# Patient Record
Sex: MALE | Race: WHITE | Employment: OTHER | URBAN - METROPOLITAN AREA
[De-identification: names, ages, dates, MRNs, and addresses within clinical notes are randomized per-mention and may not be internally consistent; named-entity substitution may affect disease eponyms.]

---

## 2021-08-09 NOTE — PERIOP NOTES
PER PAT :  CALLED AND MADE PAT APPT FOR 8/10-900AM,   COVID QUESTIONS COMPLETED.    COVID VAX W/PFIZER, LAST DOSE WAS 2/27/21

## 2021-08-10 ENCOUNTER — HOSPITAL ENCOUNTER (OUTPATIENT)
Dept: PREADMISSION TESTING | Age: 66
Discharge: HOME | End: 2021-08-10
Payer: MEDICARE

## 2021-08-10 VITALS
WEIGHT: 192.9 LBS | TEMPERATURE: 98.1 F | BODY MASS INDEX: 27.62 KG/M2 | HEART RATE: 62 BPM | HEIGHT: 70 IN | DIASTOLIC BLOOD PRESSURE: 81 MMHG | SYSTOLIC BLOOD PRESSURE: 136 MMHG | OXYGEN SATURATION: 96 %

## 2021-08-10 LAB
BASOPHILS # BLD: 0.1 K/UL (ref 0–0.1)
BASOPHILS NFR BLD: 1 % (ref 0–1)
DIFFERENTIAL METHOD BLD: NORMAL
EOSINOPHIL # BLD: 0.4 K/UL (ref 0–0.4)
EOSINOPHIL NFR BLD: 6 % (ref 0–7)
ERYTHROCYTE [DISTWIDTH] IN BLOOD BY AUTOMATED COUNT: 13.3 % (ref 11.5–14.5)
HCT VFR BLD AUTO: 43.7 % (ref 36.6–50.3)
HGB BLD-MCNC: 14.6 G/DL (ref 12.1–17)
IMM GRANULOCYTES # BLD AUTO: 0 K/UL (ref 0–0.04)
IMM GRANULOCYTES NFR BLD AUTO: 0 % (ref 0–0.5)
LYMPHOCYTES # BLD: 1.8 K/UL (ref 0.8–3.5)
LYMPHOCYTES NFR BLD: 26 % (ref 12–49)
MCH RBC QN AUTO: 31.4 PG (ref 26–34)
MCHC RBC AUTO-ENTMCNC: 33.4 G/DL (ref 30–36.5)
MCV RBC AUTO: 94 FL (ref 80–99)
MONOCYTES # BLD: 0.7 K/UL (ref 0–1)
MONOCYTES NFR BLD: 10 % (ref 5–13)
NEUTS SEG # BLD: 3.8 K/UL (ref 1.8–8)
NEUTS SEG NFR BLD: 57 % (ref 32–75)
NRBC # BLD: 0 K/UL (ref 0–0.01)
NRBC BLD-RTO: 0 PER 100 WBC
PLATELET # BLD AUTO: 179 K/UL (ref 150–400)
PMV BLD AUTO: 10.1 FL (ref 8.9–12.9)
RBC # BLD AUTO: 4.65 M/UL (ref 4.1–5.7)
WBC # BLD AUTO: 6.7 K/UL (ref 4.1–11.1)

## 2021-08-10 PROCEDURE — 85025 COMPLETE CBC W/AUTO DIFF WBC: CPT

## 2021-08-10 PROCEDURE — 36415 COLL VENOUS BLD VENIPUNCTURE: CPT

## 2021-08-10 PROCEDURE — 93005 ELECTROCARDIOGRAM TRACING: CPT

## 2021-08-10 RX ORDER — ATORVASTATIN CALCIUM 20 MG/1
20 TABLET, FILM COATED ORAL
COMMUNITY

## 2021-08-10 RX ORDER — ASPIRIN 81 MG/1
81 TABLET ORAL DAILY
COMMUNITY

## 2021-08-10 RX ORDER — CHOLECALCIFEROL TAB 125 MCG (5000 UNIT) 125 MCG
TAB ORAL DAILY
COMMUNITY

## 2021-08-10 RX ORDER — LISINOPRIL 5 MG/1
5 TABLET ORAL DAILY
COMMUNITY

## 2021-08-10 RX ORDER — METOPROLOL SUCCINATE 50 MG/1
50 TABLET, EXTENDED RELEASE ORAL DAILY
COMMUNITY

## 2021-08-10 NOTE — PERIOP NOTES
Preoperative instructions reviewed with patient. Patient given SSI infection FAQS sheet. Given two bottles of skin prep chlorhexidine soap; given written and verbal instructions on use. Patient was given the opportunity to ask questions on the information provided.

## 2021-08-11 LAB
ATRIAL RATE: 56 BPM
BACTERIA SPEC CULT: NORMAL
BACTERIA SPEC CULT: NORMAL
CALCULATED P AXIS, ECG09: 53 DEGREES
CALCULATED R AXIS, ECG10: 29 DEGREES
CALCULATED T AXIS, ECG11: 40 DEGREES
DIAGNOSIS, 93000: NORMAL
P-R INTERVAL, ECG05: 230 MS
Q-T INTERVAL, ECG07: 454 MS
QRS DURATION, ECG06: 88 MS
QTC CALCULATION (BEZET), ECG08: 438 MS
SERVICE CMNT-IMP: NORMAL
VENTRICULAR RATE, ECG03: 56 BPM

## 2021-08-16 NOTE — PERIOP NOTES
PAT Nurse Practitioner   Pre-Operative Chart Review/Assessment:-ORTHOPEDIC/NEUROSURGICAL SPINE                Patient Name:  Claire Ko                                                           Age:   77 y.o.    :  1955     Today's Date:  2021     Date of PAT:   8/10/2021      Date of Surgery:    2021      Procedure(s):  C3-C6 ACDF     Surgeon:   Dr. Brielle Maguire                       PLAN:       1)  PCP: Dr. Jacky Salgado        2)  Cardiac Clearance: Pt followed by Dr. Alon Ronquillo. LOV 20. Condition stable. No new symptoms or changes to current regimen. OV note on chart. 3)  Diabetic Treatment Consult: A1c not ordered. No hx of DM. 4)  Sleep Apnea evaluation:  + [facility] dx. Pt uses oral appliance. 5)  Treatment for MRSA/Staph Aureus: Neg       6)  Additional Concerns: Former smoker, HTN, CAD s/p stent              Vital Signs:         Vitals:    08/10/21 0922   BP: 136/81   Pulse: 62   Temp: 98.1 °F (36.7 °C)   SpO2: 96%   Weight: 87.5 kg (192 lb 14.4 oz)   Height: 5' 10\" (1.778 m)            ____________________________________________  PAST MEDICAL HISTORY  Past Medical History:   Diagnosis Date    Adrenal mass ([address]     STABLE AND BENIGN    Arthritis     CAD (coronary artery disease) 2011    MI X 1 STENT    Chronic pain     Hypertension     Sleep apnea     ORAL APPLIANCE      ____________________________________________  PAST SURGICAL HISTORY  Past Surgical History:   Procedure Laterality Date    HX BACK SURGERY  ,     LAMINECTOMY AND FUSION    HX ORTHOPAEDIC Left     REPAIR BICEP TENDON    HX VASECTOMY        ____________________________________________  HOME MEDICATIONS    Current Outpatient Medications   Medication Sig    lisinopriL (PRINIVIL, ZESTRIL) 5 mg tablet Take 5 mg by mouth daily.  metoprolol succinate (TOPROL-XL) 50 mg XL tablet Take 50 mg by mouth daily.  aspirin delayed-release 81 mg tablet Take 81 mg by mouth daily.     atorvastatin (LIPITOR) 20 mg tablet Take 20 mg by mouth nightly.  cholecalciferol (Vitamin D3) (5000 Units/125 mcg) tab tablet Take  by mouth daily. No current facility-administered medications for this encounter.      ____________________________________________  ALLERGIES  No Known Allergies   ____________________________________________  SOCIAL HISTORY  Social History     Tobacco Use    Smoking status: Former Smoker     Packs/day: 1.00     Years: 30.00     Pack years: 30.00     Quit date: 8/10/2011     Years since quitting: 10.0    Smokeless tobacco: Never Used   Substance Use Topics    Alcohol use: Yes     Alcohol/week: 8.0 standard drinks     Types: 8 Shots of liquor per week      ____________________________________________        Labs:     Hospital Outpatient Visit on 08/10/2021   Component Date Value Ref Range Status    WBC 08/10/2021 6.7  4.1 - 11.1 K/uL Final    RBC 08/10/2021 4.65  4.10 - 5.70 M/uL Final    HGB 08/10/2021 14.6  12.1 - 17.0 g/dL Final    HCT 08/10/2021 43.7  36.6 - 50.3 % Final    MCV 08/10/2021 94.0  80.0 - 99.0 FL Final    MCH 08/10/2021 31.4  26.0 - 34.0 PG Final    MCHC 08/10/2021 33.4  30.0 - 36.5 g/dL Final    RDW 08/10/2021 13.3  11.5 - 14.5 % Final    PLATELET 43/46/5936 467  150 - 400 K/uL Final    MPV 08/10/2021 10.1  8.9 - 12.9 FL Final    NRBC 08/10/2021 0.0  0  WBC Final    ABSOLUTE NRBC 08/10/2021 0.00  0.00 - 0.01 K/uL Final    NEUTROPHILS 08/10/2021 57  32 - 75 % Final    LYMPHOCYTES 08/10/2021 26  12 - 49 % Final    MONOCYTES 08/10/2021 10  5 - 13 % Final    EOSINOPHILS 08/10/2021 6  0 - 7 % Final    BASOPHILS 08/10/2021 1  0 - 1 % Final    IMMATURE GRANULOCYTES 08/10/2021 0  0.0 - 0.5 % Final    ABS. NEUTROPHILS 08/10/2021 3.8  1.8 - 8.0 K/UL Final    ABS. LYMPHOCYTES 08/10/2021 1.8  0.8 - 3.5 K/UL Final    ABS. MONOCYTES 08/10/2021 0.7  0.0 - 1.0 K/UL Final    ABS. EOSINOPHILS 08/10/2021 0.4  0.0 - 0.4 K/UL Final    ABS.  BASOPHILS 08/10/2021 0.1  0.0 - 0.1 K/UL Final    ABS. IMM. GRANS. 08/10/2021 0.0  0.00 - 0.04 K/UL Final    DF 08/10/2021 AUTOMATED    Final    Special Requests: 08/10/2021 NO SPECIAL REQUESTS    Final    Culture result: 08/10/2021 MRSA NOT PRESENT    Final            Ventricular Rate 08/10/2021 56  BPM Final    Atrial Rate 08/10/2021 56  BPM Final    P-R Interval 08/10/2021 230  ms Final    QRS Duration 08/10/2021 88  ms Final    Q-T Interval 08/10/2021 454  ms Final    QTC Calculation (Bezet) 08/10/2021 438  ms Final    Calculated P Axis 08/10/2021 53  degrees Final    Calculated R Axis 08/10/2021 29  degrees Final    Calculated T Axis 08/10/2021 40  degrees Final    Diagnosis 08/10/2021    Final                    Value:Sinus bradycardia with 1st degree AV block  Inferior infarct , age undetermined  Abnormal ECG  No previous ECGs available  Confirmed by Otilio Morales ([id number]) on 8/11/2021 10:37:02 AM          Skin:     Denies open wounds, cuts, sores, rashes or other areas of concern in PAT assessment.         Nadia Salinas NP

## 2021-08-18 ENCOUNTER — ANESTHESIA EVENT (OUTPATIENT)
Dept: SURGERY | Age: 66
End: 2021-08-18
Payer: MEDICARE

## 2021-08-19 ENCOUNTER — HOSPITAL ENCOUNTER (OUTPATIENT)
Age: 66
Setting detail: OBSERVATION
Discharge: HOME | End: 2021-08-20
Attending: NEUROLOGICAL SURGERY | Admitting: NEUROLOGICAL SURGERY
Payer: MEDICARE

## 2021-08-19 ENCOUNTER — ANESTHESIA (OUTPATIENT)
Dept: SURGERY | Age: 66
End: 2021-08-19
Payer: MEDICARE

## 2021-08-19 ENCOUNTER — APPOINTMENT (OUTPATIENT)
Dept: GENERAL RADIOLOGY | Age: 66
End: 2021-08-19
Attending: NEUROLOGICAL SURGERY
Payer: MEDICARE

## 2021-08-19 DIAGNOSIS — Z98.1 S/P CERVICAL SPINAL FUSION: Primary | ICD-10-CM

## 2021-08-19 PROBLEM — M48.02 CERVICAL STENOSIS OF SPINAL CANAL: Status: ACTIVE | Noted: 2021-08-19

## 2021-08-19 LAB
GLUCOSE BLD STRIP.AUTO-MCNC: 109 MG/DL (ref 65–117)
SERVICE CMNT-IMP: NORMAL

## 2021-08-19 PROCEDURE — 74011250637 HC RX REV CODE- 250/637: Performed by: ANESTHESIOLOGY

## 2021-08-19 PROCEDURE — 77030038552 HC DRN WND MDII -A: Performed by: NEUROLOGICAL SURGERY

## 2021-08-19 PROCEDURE — 77030003029 HC SUT VCRL J&J -B: Performed by: NEUROLOGICAL SURGERY

## 2021-08-19 PROCEDURE — 74011250637 HC RX REV CODE- 250/637: Performed by: PHYSICIAN ASSISTANT

## 2021-08-19 PROCEDURE — 77030011267 HC ELECTRD BLD COVD -A: Performed by: NEUROLOGICAL SURGERY

## 2021-08-19 PROCEDURE — 74011250636 HC RX REV CODE- 250/636: Performed by: ANESTHESIOLOGY

## 2021-08-19 PROCEDURE — 77030012890

## 2021-08-19 PROCEDURE — 77030014006 HC SPNG HEMSTAT J&J -A: Performed by: NEUROLOGICAL SURGERY

## 2021-08-19 PROCEDURE — C1713 ANCHOR/SCREW BN/BN,TIS/BN: HCPCS | Performed by: NEUROLOGICAL SURGERY

## 2021-08-19 PROCEDURE — 99218 HC RM OBSERVATION: CPT

## 2021-08-19 PROCEDURE — 74011250637 HC RX REV CODE- 250/637: Performed by: NEUROLOGICAL SURGERY

## 2021-08-19 PROCEDURE — 77030002933 HC SUT MCRYL J&J -A: Performed by: NEUROLOGICAL SURGERY

## 2021-08-19 PROCEDURE — 77030014650 HC SEAL MTRX FLOSEL BAXT -C: Performed by: NEUROLOGICAL SURGERY

## 2021-08-19 PROCEDURE — 76010000174 HC OR TIME 3.5 TO 4 HR INTENSV-TIER 1: Performed by: NEUROLOGICAL SURGERY

## 2021-08-19 PROCEDURE — 77030040608 HC GRFT BN MATR VIVIGEN FORMBL LIFV -I: Performed by: NEUROLOGICAL SURGERY

## 2021-08-19 PROCEDURE — 82962 GLUCOSE BLOOD TEST: CPT

## 2021-08-19 PROCEDURE — 74011000250 HC RX REV CODE- 250: Performed by: NEUROLOGICAL SURGERY

## 2021-08-19 PROCEDURE — 2709999900 HC NON-CHARGEABLE SUPPLY: Performed by: NEUROLOGICAL SURGERY

## 2021-08-19 PROCEDURE — 77030008684 HC TU ET CUF COVD -B: Performed by: ANESTHESIOLOGY

## 2021-08-19 PROCEDURE — 77030030028 HC BIT DRL SPN FLAT CHK DSP J&J -B: Performed by: NEUROLOGICAL SURGERY

## 2021-08-19 PROCEDURE — 76060000038 HC ANESTHESIA 3.5 TO 4 HR: Performed by: NEUROLOGICAL SURGERY

## 2021-08-19 PROCEDURE — 74011250636 HC RX REV CODE- 250/636: Performed by: NURSE ANESTHETIST, CERTIFIED REGISTERED

## 2021-08-19 PROCEDURE — 77030004391 HC BUR FLUT MEDT -C: Performed by: NEUROLOGICAL SURGERY

## 2021-08-19 PROCEDURE — 77030003666 HC NDL SPINAL BD -A: Performed by: NEUROLOGICAL SURGERY

## 2021-08-19 PROCEDURE — 77030010507 HC ADH SKN DERMBND J&J -B: Performed by: NEUROLOGICAL SURGERY

## 2021-08-19 PROCEDURE — 74011250636 HC RX REV CODE- 250/636: Performed by: NEUROLOGICAL SURGERY

## 2021-08-19 PROCEDURE — 74011000250 HC RX REV CODE- 250: Performed by: NURSE ANESTHETIST, CERTIFIED REGISTERED

## 2021-08-19 PROCEDURE — 77030026438 HC STYL ET INTUB CARD -A: Performed by: ANESTHESIOLOGY

## 2021-08-19 PROCEDURE — 74011000250 HC RX REV CODE- 250: Performed by: ANESTHESIOLOGY

## 2021-08-19 PROCEDURE — 77030029099 HC BN WAX SSPC -A: Performed by: NEUROLOGICAL SURGERY

## 2021-08-19 PROCEDURE — 76210000016 HC OR PH I REC 1 TO 1.5 HR: Performed by: NEUROLOGICAL SURGERY

## 2021-08-19 PROCEDURE — 77030038600 HC TU BPLR IRR DISP STRY -B: Performed by: NEUROLOGICAL SURGERY

## 2021-08-19 PROCEDURE — 77030013079 HC BLNKT BAIR HGGR 3M -A: Performed by: ANESTHESIOLOGY

## 2021-08-19 PROCEDURE — 77030040922 HC BLNKT HYPOTHRM STRY -A

## 2021-08-19 PROCEDURE — C1821 INTERSPINOUS IMPLANT: HCPCS | Performed by: NEUROLOGICAL SURGERY

## 2021-08-19 DEVICE — GRAFT BNE SUB M 5ML CANC DBM FRMBL CELLULAR VIVIGEN: Type: IMPLANTABLE DEVICE | Site: SPINE CERVICAL | Status: FUNCTIONAL

## 2021-08-19 DEVICE — SCREW SPNL L16MM DIA4MM ANT CERV TI SELF DRL VAR ANG FULL: Type: IMPLANTABLE DEVICE | Site: SPINE CERVICAL | Status: FUNCTIONAL

## 2021-08-19 DEVICE — IMPLANTABLE DEVICE: Type: IMPLANTABLE DEVICE | Site: SPINE CERVICAL | Status: FUNCTIONAL

## 2021-08-19 DEVICE — IMPLANTABLE DEVICE
Type: IMPLANTABLE DEVICE | Site: SPINE CERVICAL | Status: FUNCTIONAL
Brand: ACIS® PROTI 360°™

## 2021-08-19 DEVICE — SCREW SPNL L16MM DIA4MM ANT CERV TI SELF DRL FULL THRD: Type: IMPLANTABLE DEVICE | Site: SPINE CERVICAL | Status: FUNCTIONAL

## 2021-08-19 RX ORDER — MIDAZOLAM HYDROCHLORIDE 1 MG/ML
INJECTION, SOLUTION INTRAMUSCULAR; INTRAVENOUS AS NEEDED
Status: DISCONTINUED | OUTPATIENT
Start: 2021-08-19 | End: 2021-08-19 | Stop reason: HOSPADM

## 2021-08-19 RX ORDER — DEXTROSE, SODIUM CHLORIDE, SODIUM LACTATE, POTASSIUM CHLORIDE, AND CALCIUM CHLORIDE 5; .6; .31; .03; .02 G/100ML; G/100ML; G/100ML; G/100ML; G/100ML
125 INJECTION, SOLUTION INTRAVENOUS CONTINUOUS
Status: DISCONTINUED | OUTPATIENT
Start: 2021-08-19 | End: 2021-08-19 | Stop reason: HOSPADM

## 2021-08-19 RX ORDER — FENTANYL CITRATE 50 UG/ML
25 INJECTION, SOLUTION INTRAMUSCULAR; INTRAVENOUS
Status: COMPLETED | OUTPATIENT
Start: 2021-08-19 | End: 2021-08-19

## 2021-08-19 RX ORDER — METOPROLOL SUCCINATE 50 MG/1
50 TABLET, EXTENDED RELEASE ORAL DAILY
Status: DISCONTINUED | OUTPATIENT
Start: 2021-08-20 | End: 2021-08-20 | Stop reason: HOSPADM

## 2021-08-19 RX ORDER — SODIUM CHLORIDE 0.9 % (FLUSH) 0.9 %
5-40 SYRINGE (ML) INJECTION EVERY 8 HOURS
Status: DISCONTINUED | OUTPATIENT
Start: 2021-08-19 | End: 2021-08-19 | Stop reason: HOSPADM

## 2021-08-19 RX ORDER — FENTANYL CITRATE 50 UG/ML
50 INJECTION, SOLUTION INTRAMUSCULAR; INTRAVENOUS AS NEEDED
Status: DISCONTINUED | OUTPATIENT
Start: 2021-08-19 | End: 2021-08-19 | Stop reason: HOSPADM

## 2021-08-19 RX ORDER — LIDOCAINE HYDROCHLORIDE 20 MG/ML
INJECTION, SOLUTION EPIDURAL; INFILTRATION; INTRACAUDAL; PERINEURAL AS NEEDED
Status: DISCONTINUED | OUTPATIENT
Start: 2021-08-19 | End: 2021-08-19 | Stop reason: HOSPADM

## 2021-08-19 RX ORDER — ONDANSETRON 2 MG/ML
4 INJECTION INTRAMUSCULAR; INTRAVENOUS
Status: DISCONTINUED | OUTPATIENT
Start: 2021-08-19 | End: 2021-08-20 | Stop reason: HOSPADM

## 2021-08-19 RX ORDER — MELATONIN
1000 DAILY
Status: DISCONTINUED | OUTPATIENT
Start: 2021-08-20 | End: 2021-08-20 | Stop reason: HOSPADM

## 2021-08-19 RX ORDER — KETAMINE HYDROCHLORIDE 10 MG/ML
INJECTION, SOLUTION INTRAMUSCULAR; INTRAVENOUS AS NEEDED
Status: DISCONTINUED | OUTPATIENT
Start: 2021-08-19 | End: 2021-08-19 | Stop reason: HOSPADM

## 2021-08-19 RX ORDER — ACETAMINOPHEN 325 MG/1
650 TABLET ORAL ONCE
Status: COMPLETED | OUTPATIENT
Start: 2021-08-19 | End: 2021-08-19

## 2021-08-19 RX ORDER — SODIUM CHLORIDE 9 MG/ML
125 INJECTION, SOLUTION INTRAVENOUS CONTINUOUS
Status: DISCONTINUED | OUTPATIENT
Start: 2021-08-19 | End: 2021-08-20 | Stop reason: HOSPADM

## 2021-08-19 RX ORDER — HYDROMORPHONE HYDROCHLORIDE 2 MG/ML
INJECTION, SOLUTION INTRAMUSCULAR; INTRAVENOUS; SUBCUTANEOUS AS NEEDED
Status: DISCONTINUED | OUTPATIENT
Start: 2021-08-19 | End: 2021-08-19 | Stop reason: HOSPADM

## 2021-08-19 RX ORDER — MIDAZOLAM HYDROCHLORIDE 1 MG/ML
1 INJECTION, SOLUTION INTRAMUSCULAR; INTRAVENOUS AS NEEDED
Status: DISCONTINUED | OUTPATIENT
Start: 2021-08-19 | End: 2021-08-19 | Stop reason: HOSPADM

## 2021-08-19 RX ORDER — PROPOFOL 10 MG/ML
INJECTION, EMULSION INTRAVENOUS AS NEEDED
Status: DISCONTINUED | OUTPATIENT
Start: 2021-08-19 | End: 2021-08-19 | Stop reason: HOSPADM

## 2021-08-19 RX ORDER — OXYCODONE HYDROCHLORIDE 5 MG/1
5 TABLET ORAL
Status: DISCONTINUED | OUTPATIENT
Start: 2021-08-19 | End: 2021-08-20 | Stop reason: HOSPADM

## 2021-08-19 RX ORDER — SODIUM CHLORIDE 0.9 % (FLUSH) 0.9 %
5-40 SYRINGE (ML) INJECTION AS NEEDED
Status: DISCONTINUED | OUTPATIENT
Start: 2021-08-19 | End: 2021-08-19 | Stop reason: HOSPADM

## 2021-08-19 RX ORDER — OXYCODONE HYDROCHLORIDE 5 MG/1
10 TABLET ORAL
Status: DISCONTINUED | OUTPATIENT
Start: 2021-08-19 | End: 2021-08-20 | Stop reason: HOSPADM

## 2021-08-19 RX ORDER — LIDOCAINE HYDROCHLORIDE 10 MG/ML
0.5 INJECTION, SOLUTION EPIDURAL; INFILTRATION; INTRACAUDAL; PERINEURAL AS NEEDED
Status: DISCONTINUED | OUTPATIENT
Start: 2021-08-19 | End: 2021-08-19 | Stop reason: HOSPADM

## 2021-08-19 RX ORDER — SUCCINYLCHOLINE CHLORIDE 20 MG/ML
INJECTION INTRAMUSCULAR; INTRAVENOUS AS NEEDED
Status: DISCONTINUED | OUTPATIENT
Start: 2021-08-19 | End: 2021-08-19 | Stop reason: HOSPADM

## 2021-08-19 RX ORDER — ONDANSETRON 2 MG/ML
INJECTION INTRAMUSCULAR; INTRAVENOUS AS NEEDED
Status: DISCONTINUED | OUTPATIENT
Start: 2021-08-19 | End: 2021-08-19 | Stop reason: HOSPADM

## 2021-08-19 RX ORDER — DEXAMETHASONE SODIUM PHOSPHATE 4 MG/ML
4 INJECTION, SOLUTION INTRA-ARTICULAR; INTRALESIONAL; INTRAMUSCULAR; INTRAVENOUS; SOFT TISSUE EVERY 6 HOURS
Status: COMPLETED | OUTPATIENT
Start: 2021-08-19 | End: 2021-08-20

## 2021-08-19 RX ORDER — SODIUM CHLORIDE 0.9 % (FLUSH) 0.9 %
5-40 SYRINGE (ML) INJECTION AS NEEDED
Status: DISCONTINUED | OUTPATIENT
Start: 2021-08-19 | End: 2021-08-20 | Stop reason: HOSPADM

## 2021-08-19 RX ORDER — AMOXICILLIN 250 MG
1 CAPSULE ORAL 2 TIMES DAILY
Status: DISCONTINUED | OUTPATIENT
Start: 2021-08-19 | End: 2021-08-20 | Stop reason: HOSPADM

## 2021-08-19 RX ORDER — DIPHENHYDRAMINE HYDROCHLORIDE 50 MG/ML
12.5 INJECTION, SOLUTION INTRAMUSCULAR; INTRAVENOUS AS NEEDED
Status: DISCONTINUED | OUTPATIENT
Start: 2021-08-19 | End: 2021-08-19 | Stop reason: HOSPADM

## 2021-08-19 RX ORDER — ATORVASTATIN CALCIUM 20 MG/1
20 TABLET, FILM COATED ORAL
Status: DISCONTINUED | OUTPATIENT
Start: 2021-08-19 | End: 2021-08-20 | Stop reason: HOSPADM

## 2021-08-19 RX ORDER — FAMOTIDINE 20 MG/1
20 TABLET, FILM COATED ORAL
Status: DISCONTINUED | OUTPATIENT
Start: 2021-08-19 | End: 2021-08-20 | Stop reason: HOSPADM

## 2021-08-19 RX ORDER — SODIUM CHLORIDE, SODIUM LACTATE, POTASSIUM CHLORIDE, CALCIUM CHLORIDE 600; 310; 30; 20 MG/100ML; MG/100ML; MG/100ML; MG/100ML
125 INJECTION, SOLUTION INTRAVENOUS CONTINUOUS
Status: DISCONTINUED | OUTPATIENT
Start: 2021-08-19 | End: 2021-08-19 | Stop reason: HOSPADM

## 2021-08-19 RX ORDER — DEXAMETHASONE SODIUM PHOSPHATE 4 MG/ML
INJECTION, SOLUTION INTRA-ARTICULAR; INTRALESIONAL; INTRAMUSCULAR; INTRAVENOUS; SOFT TISSUE AS NEEDED
Status: DISCONTINUED | OUTPATIENT
Start: 2021-08-19 | End: 2021-08-19 | Stop reason: HOSPADM

## 2021-08-19 RX ORDER — ACETAMINOPHEN 500 MG
1000 TABLET ORAL EVERY 6 HOURS
Status: DISCONTINUED | OUTPATIENT
Start: 2021-08-19 | End: 2021-08-20 | Stop reason: HOSPADM

## 2021-08-19 RX ORDER — LISINOPRIL 5 MG/1
5 TABLET ORAL DAILY
Status: DISCONTINUED | OUTPATIENT
Start: 2021-08-20 | End: 2021-08-20 | Stop reason: HOSPADM

## 2021-08-19 RX ORDER — OXYCODONE HYDROCHLORIDE 5 MG/1
5 TABLET ORAL AS NEEDED
Status: DISCONTINUED | OUTPATIENT
Start: 2021-08-19 | End: 2021-08-19 | Stop reason: HOSPADM

## 2021-08-19 RX ORDER — SODIUM CHLORIDE 0.9 % (FLUSH) 0.9 %
5-40 SYRINGE (ML) INJECTION EVERY 8 HOURS
Status: DISCONTINUED | OUTPATIENT
Start: 2021-08-19 | End: 2021-08-20 | Stop reason: HOSPADM

## 2021-08-19 RX ORDER — ONDANSETRON 2 MG/ML
4 INJECTION INTRAMUSCULAR; INTRAVENOUS AS NEEDED
Status: DISCONTINUED | OUTPATIENT
Start: 2021-08-19 | End: 2021-08-19 | Stop reason: HOSPADM

## 2021-08-19 RX ORDER — HYDROMORPHONE HYDROCHLORIDE 1 MG/ML
1 INJECTION, SOLUTION INTRAMUSCULAR; INTRAVENOUS; SUBCUTANEOUS
Status: DISCONTINUED | OUTPATIENT
Start: 2021-08-19 | End: 2021-08-20 | Stop reason: HOSPADM

## 2021-08-19 RX ORDER — MORPHINE SULFATE 2 MG/ML
2 INJECTION, SOLUTION INTRAMUSCULAR; INTRAVENOUS
Status: DISCONTINUED | OUTPATIENT
Start: 2021-08-19 | End: 2021-08-19 | Stop reason: HOSPADM

## 2021-08-19 RX ORDER — FENTANYL CITRATE 50 UG/ML
INJECTION, SOLUTION INTRAMUSCULAR; INTRAVENOUS AS NEEDED
Status: DISCONTINUED | OUTPATIENT
Start: 2021-08-19 | End: 2021-08-19 | Stop reason: HOSPADM

## 2021-08-19 RX ORDER — MIDAZOLAM HYDROCHLORIDE 1 MG/ML
1 INJECTION, SOLUTION INTRAMUSCULAR; INTRAVENOUS
Status: DISCONTINUED | OUTPATIENT
Start: 2021-08-19 | End: 2021-08-19 | Stop reason: HOSPADM

## 2021-08-19 RX ORDER — EPHEDRINE SULFATE/0.9% NACL/PF 50 MG/5 ML
SYRINGE (ML) INTRAVENOUS AS NEEDED
Status: DISCONTINUED | OUTPATIENT
Start: 2021-08-19 | End: 2021-08-19 | Stop reason: HOSPADM

## 2021-08-19 RX ORDER — NALOXONE HYDROCHLORIDE 0.4 MG/ML
0.4 INJECTION, SOLUTION INTRAMUSCULAR; INTRAVENOUS; SUBCUTANEOUS AS NEEDED
Status: DISCONTINUED | OUTPATIENT
Start: 2021-08-19 | End: 2021-08-20 | Stop reason: HOSPADM

## 2021-08-19 RX ORDER — ROCURONIUM BROMIDE 10 MG/ML
INJECTION, SOLUTION INTRAVENOUS AS NEEDED
Status: DISCONTINUED | OUTPATIENT
Start: 2021-08-19 | End: 2021-08-19 | Stop reason: HOSPADM

## 2021-08-19 RX ADMIN — Medication 10 MG: at 12:56

## 2021-08-19 RX ADMIN — SODIUM CHLORIDE 125 ML/HR: 9 INJECTION, SOLUTION INTRAVENOUS at 15:56

## 2021-08-19 RX ADMIN — ATORVASTATIN CALCIUM 20 MG: 20 TABLET, FILM COATED ORAL at 21:36

## 2021-08-19 RX ADMIN — HYDROMORPHONE HYDROCHLORIDE 0.4 MG: 2 INJECTION, SOLUTION INTRAMUSCULAR; INTRAVENOUS; SUBCUTANEOUS at 13:14

## 2021-08-19 RX ADMIN — KETAMINE HYDROCHLORIDE 20 MG: 10 INJECTION, SOLUTION INTRAMUSCULAR; INTRAVENOUS at 12:12

## 2021-08-19 RX ADMIN — Medication 5 MG: at 13:26

## 2021-08-19 RX ADMIN — HYDROMORPHONE HYDROCHLORIDE 0.6 MG: 2 INJECTION, SOLUTION INTRAMUSCULAR; INTRAVENOUS; SUBCUTANEOUS at 14:31

## 2021-08-19 RX ADMIN — FENTANYL CITRATE 25 MCG: 50 INJECTION INTRAMUSCULAR; INTRAVENOUS at 17:15

## 2021-08-19 RX ADMIN — PROPOFOL 50 MG: 10 INJECTION, EMULSION INTRAVENOUS at 12:12

## 2021-08-19 RX ADMIN — MEPERIDINE HYDROCHLORIDE 12.5 MG: 25 INJECTION INTRAMUSCULAR; INTRAVENOUS; SUBCUTANEOUS at 15:37

## 2021-08-19 RX ADMIN — ONDANSETRON HYDROCHLORIDE 4 MG: 2 INJECTION, SOLUTION INTRAMUSCULAR; INTRAVENOUS at 14:12

## 2021-08-19 RX ADMIN — ROCURONIUM BROMIDE 30 MG: 10 SOLUTION INTRAVENOUS at 11:51

## 2021-08-19 RX ADMIN — FENTANYL CITRATE 50 MCG: 50 INJECTION, SOLUTION INTRAMUSCULAR; INTRAVENOUS at 11:47

## 2021-08-19 RX ADMIN — FENTANYL CITRATE 25 MCG: 50 INJECTION INTRAMUSCULAR; INTRAVENOUS at 16:20

## 2021-08-19 RX ADMIN — DEXAMETHASONE SODIUM PHOSPHATE 4 MG: 4 INJECTION, SOLUTION INTRAMUSCULAR; INTRAVENOUS at 19:35

## 2021-08-19 RX ADMIN — FAMOTIDINE 20 MG: 20 TABLET ORAL at 18:13

## 2021-08-19 RX ADMIN — SUCCINYLCHOLINE CHLORIDE 160 MG: 20 INJECTION, SOLUTION INTRAMUSCULAR; INTRAVENOUS at 11:48

## 2021-08-19 RX ADMIN — ROCURONIUM BROMIDE 20 MG: 10 SOLUTION INTRAVENOUS at 12:30

## 2021-08-19 RX ADMIN — SODIUM CHLORIDE, POTASSIUM CHLORIDE, SODIUM LACTATE AND CALCIUM CHLORIDE 125 ML/HR: 600; 310; 30; 20 INJECTION, SOLUTION INTRAVENOUS at 10:43

## 2021-08-19 RX ADMIN — ONDANSETRON 4 MG: 2 INJECTION INTRAMUSCULAR; INTRAVENOUS at 17:44

## 2021-08-19 RX ADMIN — WATER 2 G: 1 INJECTION INTRAMUSCULAR; INTRAVENOUS; SUBCUTANEOUS at 12:02

## 2021-08-19 RX ADMIN — FENTANYL CITRATE 25 MCG: 50 INJECTION INTRAMUSCULAR; INTRAVENOUS at 17:30

## 2021-08-19 RX ADMIN — FENTANYL CITRATE 50 MCG: 50 INJECTION, SOLUTION INTRAMUSCULAR; INTRAVENOUS at 12:12

## 2021-08-19 RX ADMIN — SUGAMMADEX 160 MG: 100 INJECTION, SOLUTION INTRAVENOUS at 14:26

## 2021-08-19 RX ADMIN — MIDAZOLAM 2 MG: 1 INJECTION INTRAMUSCULAR; INTRAVENOUS at 11:44

## 2021-08-19 RX ADMIN — PHENYLEPHRINE HYDROCHLORIDE 80 MCG: 10 INJECTION INTRAVENOUS at 12:56

## 2021-08-19 RX ADMIN — ROCURONIUM BROMIDE 10 MG: 10 SOLUTION INTRAVENOUS at 13:26

## 2021-08-19 RX ADMIN — KETAMINE HYDROCHLORIDE 20 MG: 10 INJECTION, SOLUTION INTRAMUSCULAR; INTRAVENOUS at 13:11

## 2021-08-19 RX ADMIN — PHENYLEPHRINE HYDROCHLORIDE 40 MCG: 10 INJECTION INTRAVENOUS at 12:50

## 2021-08-19 RX ADMIN — ACETAMINOPHEN 1000 MG: 500 TABLET ORAL at 19:36

## 2021-08-19 RX ADMIN — DOCUSATE SODIUM 50 MG AND SENNOSIDES 8.6 MG 1 TABLET: 8.6; 5 TABLET, FILM COATED ORAL at 20:22

## 2021-08-19 RX ADMIN — ACETAMINOPHEN 650 MG: 325 TABLET ORAL at 10:31

## 2021-08-19 RX ADMIN — CEFAZOLIN SODIUM 2 G: 1 INJECTION, POWDER, FOR SOLUTION INTRAMUSCULAR; INTRAVENOUS at 19:36

## 2021-08-19 RX ADMIN — DEXAMETHASONE SODIUM PHOSPHATE 10 MG: 4 INJECTION, SOLUTION INTRAMUSCULAR; INTRAVENOUS at 11:48

## 2021-08-19 RX ADMIN — FENTANYL CITRATE 25 MCG: 50 INJECTION INTRAMUSCULAR; INTRAVENOUS at 17:20

## 2021-08-19 RX ADMIN — ROCURONIUM BROMIDE 20 MG: 10 SOLUTION INTRAVENOUS at 13:07

## 2021-08-19 RX ADMIN — PROPOFOL 200 MG: 10 INJECTION, EMULSION INTRAVENOUS at 11:47

## 2021-08-19 RX ADMIN — PHENYLEPHRINE HYDROCHLORIDE 80 MCG: 10 INJECTION INTRAVENOUS at 12:53

## 2021-08-19 RX ADMIN — LIDOCAINE HYDROCHLORIDE 80 MG: 20 INJECTION, SOLUTION EPIDURAL; INFILTRATION; INTRACAUDAL; PERINEURAL at 11:47

## 2021-08-19 NOTE — ROUTINE PROCESS
Patient: Abdiaziz Allen MRN: [id number]  SSN: [id number]   YOB: 1955  Age: 77 y.o. Sex: male     Patient is status post Procedure(s):  C3-6 ANTERIOR CERVICAL DISCECTOMY AND FUSION WITH DEPUY PEEK SPACER, SKYLINE PLATE. Surgeon(s) and Role:     * Shila Trujillo MD - Primary    Local/Dose/Irrigation:  No local                  Peripheral IV 08/19/21 Left Hand (Active)          Hemovac Anterior Neck (Active)   Site Assessment Clean, dry, & intact 08/19/21 1410   Dressing Status Clean, dry, & intact 08/19/21 1410   Drainage Description Serosanguinous 08/19/21 1410   Status Patent; Charged;Draining 08/19/21 1410      Airway - Endotracheal Tube 08/19/21 Oral (Active)                   Dressing/Packing:  Incision 08/19/21 Neck Anterior; Left-Dressing/Treatment: Adhesive bandage;Surgical glue (08/19/21 1416)    Splint/Cast:  ]    Other:  Dermabond, island dressing and cervical collar

## 2021-08-19 NOTE — ANESTHESIA POSTPROCEDURE EVALUATION
Post-Anesthesia Evaluation and Assessment    Patient: Tung Rios MRN: [id number]  SSN: [id number]    YOB: 1955  Age: 77 y.o. Sex: male      I have evaluated the patient and they are stable and ready for discharge from the PACU. Cardiovascular Function/Vital Signs  Visit Vitals  /89   Pulse 69   Temp 36.4 °C (97.6 °F)   Resp 10   Ht 5' 10\" (1.778 m)   Wt 87.5 kg (192 lb 14.4 oz)   SpO2 98%   BMI 27.68 kg/m²       Patient is status post General anesthesia for Procedure(s):  C3-6 ANTERIOR CERVICAL DISCECTOMY AND FUSION WITH DEPUY PEEK SPACER, SKYLINE PLATE. Nausea/Vomiting: None    Postoperative hydration reviewed and adequate. Pain:  Pain Scale 1: Numeric (0 - 10) (08/19/21 1518)  Pain Intensity 1: 0 (08/19/21 1518)   Managed    Neurological Status:   Neuro (WDL): Exceptions to WDL (08/19/21 1518)  Neuro  Neurologic State: Drowsy (08/19/21 1518)  LUE Motor Response: Purposeful (08/19/21 1518)  LLE Motor Response: Purposeful (08/19/21 1518)  RUE Motor Response: Purposeful (08/19/21 1518)  RLE Motor Response: Purposeful (08/19/21 1518)   At baseline    Mental Status, Level of Consciousness: Alert and  oriented to person, place, and time    Pulmonary Status:   O2 Device: CO2 nasal cannula (08/19/21 1519)   Adequate oxygenation and airway patent    Complications related to anesthesia: None    Post-anesthesia assessment completed. No concerns    Signed By: Andrew Cabrales MD     August 19, 2021              Procedure(s):  C3-6 ANTERIOR CERVICAL DISCECTOMY AND FUSION WITH DEPUY PEEK SPACER, SKYLINE PLATE. general    <BSHSIANPOST>    INITIAL Post-op Vital signs:   Vitals Value Taken Time   /90 08/19/21 1535   Temp 36.4 °C (97.6 °F) 08/19/21 1519   Pulse 67 08/19/21 1539   Resp 12 08/19/21 1539   SpO2 96 % 08/19/21 1539   Vitals shown include unvalidated device data.

## 2021-08-19 NOTE — PROGRESS NOTES
Ortho/NeuroSurgery PA Note    POD# 0  s/p C3-6 ANTERIOR CERVICAL DISCECTOMY AND FUSION WITH DEPUY PEEK SPACER, SKYLINE PLATE     Pt resting in bed in PACU. No complaints. Still drowsy. Patient has had something to drink. Nothing to eat yet. No nausea or vomiting. Most Recent Labs:   Lab Results   Component Value Date/Time    HGB 14.6 08/10/2021 09:19 AM       Body mass index is 27.68 kg/m². Reference: BMI greater than 30 is classified as obesity and greater than 40 is classified as morbid obesity. Voiding status: pending    VSS Afebrile. Dressing C/D/I  Calves soft and supple;  No pain with passive stretch  Sensation and motor intact    Plan:  s/p C3-6 ACDF   -Pain control PRN   -Anel-op Antibiotics Ancef  -PT - in rigid collar  -SCDs for mechanical DVT prophylaxis  -Decadron 4mg q6hr  -Pepcid for GI Prophylaxis  -Monitor drain output   -Advance diet as tolerated    Discharge plans pending    WHITNEY Nails

## 2021-08-19 NOTE — ANESTHESIA PREPROCEDURE EVALUATION
Relevant Problems   No relevant active problems       Anesthetic History   No history of anesthetic complications            Review of Systems / Medical History  Patient summary reviewed, nursing notes reviewed and pertinent labs reviewed    Pulmonary        Sleep apnea: No treatment           Neuro/Psych   Within defined limits           Cardiovascular    Hypertension: well controlled          CAD         GI/Hepatic/Renal  Within defined limits              Endo/Other        Arthritis     Other Findings              Physical Exam    Airway  Mallampati: II  TM Distance: > 6 cm  Neck ROM: normal range of motion   Mouth opening: Normal     Cardiovascular  Regular rate and rhythm,  S1 and S2 normal,  no murmur, click, rub, or gallop             Dental  No notable dental hx       Pulmonary  Breath sounds clear to auscultation               Abdominal  GI exam deferred       Other Findings            Anesthetic Plan    ASA: 2  Anesthesia type: general          Induction: Intravenous  Anesthetic plan and risks discussed with: Patient

## 2021-08-19 NOTE — BRIEF OP NOTE
Brief Postoperative Note    Patient: Miles Azar  YOB: 1955  MRN: [id number]    Date of Procedure: 8/19/2021     Pre-Op Diagnosis: CERVICAL STENOSIS WITH MYELOPATHY    Post-Op Diagnosis: Same as preoperative diagnosis. Procedure(s):  C3-6 ANTERIOR CERVICAL DISCECTOMY AND FUSION WITH DEPUY PEEK SPACER, SKYLINE PLATE    Surgeon(s):  Jackie Alba MD    Surgical Assistant: Surg Asst-1: Viktor Tucker    Anesthesia: General     Estimated Blood Loss (mL): less than 50     Complications: None    Specimens: * No specimens in log *     Implants:   Implant Name Type Inv.  Item Serial No.  Lot No. LRB No. Used Action   GRAFT BNE SUB M 5ML CANC DBM FRMBL CELLULAR VIVIGEN - [id number]  GRAFT BNE SUB M 5ML CANC DBM FRMBL CELLULAR VIVIGEN [id number] [facility]_WD NA N/A 1 Implanted   SPACER SPNL LORDTC 360 DEG STD [id number] MM STRL ACIS PROTI - SNA  SPACER SPNL LORDTC 360 DEG STD [id number] MM STRL ACIS PROTI NA [employer]PU[employer] [facility] [id number] N/A 1 Implanted   SPACER SPNL LORDTC 360 DEG STD [id number] MM STRL ACIS PROTI - SNA  SPACER SPNL LORDTC 360 DEG STD [id number] MM STRL ACIS PROTI NA DEPUY [employer] [facility] [id number] N/A 1 Implanted   SPACER SPNL LORDTC 360 DEG STD [id number] MM STRL ACIS PROTI - SNA  SPACER SPNL LORDTC 360 DEG STD [id number] MM STRL ACIS PROTI NA [id number] [facility] [id number] N/A 1 Implanted   SCREW SPNL L16MM DIA4MM ANT CERV TI SELF DRL FULL THRD - SNA  SCREW SPNL L16MM DIA4MM ANT CERV TI SELF DRL FULL THRD NA [facility] [employer] SPINE_ NA N/A 2 Implanted   SCREW SPNL L16MM DIA4MM ANT CERV TI SELF DRL SEJAL ANG FULL - SNA  SCREW SPNL L16MM DIA4MM ANT CERV TI SELF DRL SEJAL ANG FULL NA [facility] [employer] SPINE_ NA N/A 6 Implanted   PLATE SPNL M30CV ANT BILAT CERV TI 3 LEV [place]AS HYBRID SKYLINE - SNA  PLATE SPNL G33LL ANT BILAT CERV TI 3 LEV [place] HYBRID SKYLINE NA [employer] DEPUY [employer] SPINE_WD NA N/A 1 Implanted       Drains:   Hemovac Anterior Neck (Active)   Site Assessment Clean, dry, & intact 08/19/21 1410   Dressing Status Clean, dry, & intact 08/19/21 1410   Drainage Description Serosanguinous 08/19/21 1410   Status Patent; Charged;Draining 08/19/21 1410       Findings: stenosis    Electronically Signed by Connor Coleman MD on 8/19/2021 at 2:20 PM

## 2021-08-19 NOTE — PERIOP NOTES
Given to sterile back table:    Surgifoam, ref [address] [facility], [address] [place] [address], Exp 3/17/2025    Floseal 10ml, ref [id number], lot [id number], exp 5/15/2023

## 2021-08-20 VITALS
DIASTOLIC BLOOD PRESSURE: 61 MMHG | TEMPERATURE: 99 F | WEIGHT: 192.9 LBS | HEIGHT: 70 IN | RESPIRATION RATE: 18 BRPM | OXYGEN SATURATION: 98 % | HEART RATE: 74 BPM | BODY MASS INDEX: 27.62 KG/M2 | SYSTOLIC BLOOD PRESSURE: 105 MMHG

## 2021-08-20 PROCEDURE — 99218 HC RM OBSERVATION: CPT

## 2021-08-20 PROCEDURE — 97161 PT EVAL LOW COMPLEX 20 MIN: CPT | Performed by: PHYSICAL THERAPIST

## 2021-08-20 PROCEDURE — 74011250637 HC RX REV CODE- 250/637: Performed by: NEUROLOGICAL SURGERY

## 2021-08-20 PROCEDURE — 74011000250 HC RX REV CODE- 250: Performed by: NEUROLOGICAL SURGERY

## 2021-08-20 PROCEDURE — L0120 CERV FLEX N/ADJ FOAM PRE OTS: HCPCS

## 2021-08-20 PROCEDURE — 74011250636 HC RX REV CODE- 250/636: Performed by: NEUROLOGICAL SURGERY

## 2021-08-20 PROCEDURE — 97116 GAIT TRAINING THERAPY: CPT | Performed by: PHYSICAL THERAPIST

## 2021-08-20 RX ORDER — ACETAMINOPHEN AND CODEINE PHOSPHATE 300; 30 MG/1; MG/1
1 TABLET ORAL
Qty: 20 TABLET | Refills: 0 | Status: SHIPPED | OUTPATIENT
Start: 2021-08-20 | End: 2021-09-03

## 2021-08-20 RX ORDER — TRAMADOL HYDROCHLORIDE 50 MG/1
50 TABLET ORAL
Status: DISCONTINUED | OUTPATIENT
Start: 2021-08-20 | End: 2021-08-20 | Stop reason: HOSPADM

## 2021-08-20 RX ADMIN — ACETAMINOPHEN 1000 MG: 500 TABLET ORAL at 00:19

## 2021-08-20 RX ADMIN — DEXAMETHASONE SODIUM PHOSPHATE 4 MG: 4 INJECTION, SOLUTION INTRAMUSCULAR; INTRAVENOUS at 07:42

## 2021-08-20 RX ADMIN — ACETAMINOPHEN 1000 MG: 500 TABLET ORAL at 07:42

## 2021-08-20 RX ADMIN — DOCUSATE SODIUM 50 MG AND SENNOSIDES 8.6 MG 1 TABLET: 8.6; 5 TABLET, FILM COATED ORAL at 09:02

## 2021-08-20 RX ADMIN — CEFAZOLIN SODIUM 2 G: 1 INJECTION, POWDER, FOR SOLUTION INTRAMUSCULAR; INTRAVENOUS at 03:49

## 2021-08-20 RX ADMIN — DEXAMETHASONE SODIUM PHOSPHATE 4 MG: 4 INJECTION, SOLUTION INTRAMUSCULAR; INTRAVENOUS at 00:19

## 2021-08-20 RX ADMIN — Medication 1000 UNITS: at 09:02

## 2021-08-20 NOTE — DISCHARGE SUMMARY
Spine Discharge Summary    Patient ID:  Dieudonne Jesus  [id number]  male  77 y.o.  1955    Admit date: 8/19/2021    Discharge date: 8/20/2021    Admitting Physician: Naresh Loya MD     Consulting Physician(s):   Treatment Team: Attending Provider: Noemi Shepherd MD; Staff Nurse: Jelani Green, RN; Utilization Review: Bar Luna RN; Primary Nurse: Amando Martinez; Primary Nurse: Francisco Javier Henriquez RN    Date of Surgery:   8/19/2021     Preoperative Diagnosis:  CERVICAL STENOSIS WITH MYELOPATHY    Postoperative Diagnosis:   CERVICAL STENOSIS WITH MYELOPATHY    Procedure(s):  C3-6 ANTERIOR CERVICAL DISCECTOMY AND FUSION WITH DEPUY PEEK SPACER, SKYLINE PLATE     Anesthesia Type:   General     Surgeon: Noemi Shepherd MD                            HPI:  Pt is a 77 y.o. male who has a history of CERVICAL STENOSIS WITH MYELOPATHY  with pain and limitations of activities of daily living who presents at this time for a C3-6 ANTERIOR CERVICAL DISCECTOMY AND FUSION WITH DEPUY PEEK SPACER, SKYLINE PLATE  following the failure of conservative management. PMH:   Past Medical History:   Diagnosis Date    Adrenal mass ([address]     STABLE AND BENIGN    Arthritis     CAD (coronary artery disease) 11/2011    MI X 1 STENT    Chronic pain     Hypertension     Sleep apnea     ORAL APPLIANCE       Body mass index is 27.68 kg/m². : A BMI > 30 is classified as obesity and > 40 is classified as morbid obesity. Medications upon admission :   Prior to Admission Medications   Prescriptions Last Dose Informant Patient Reported? Taking?   aspirin delayed-release 81 mg tablet 8/13/2021  Yes No   Sig: Take 81 mg by mouth daily. atorvastatin (LIPITOR) 20 mg tablet 8/18/2021 at Unknown time  Yes Yes   Sig: Take 20 mg by mouth nightly. cholecalciferol (Vitamin D3) (5000 Units/125 mcg) tab tablet 8/12/2021  Yes No   Sig: Take  by mouth daily.    lisinopriL (PRINIVIL, ZESTRIL) 5 mg tablet 8/18/2021 at Unknown time Yes Yes   Sig: Take 5 mg by mouth daily. metoprolol succinate (TOPROL-XL) 50 mg XL tablet 8/19/2021 at 7am  Yes Yes   Sig: Take 50 mg by mouth daily. Facility-Administered Medications: None        Allergies:  No Known Allergies     Hospital Course: The patient underwent surgery. Complications:  None; patient tolerated the procedure well. Was taken to the PACU in stable condition and then transferred to the ortho floor. Perioperative Antibiotics:  Ancef     Postoperative Pain Management:  Tylenol, dilaudid    Postoperative transfusions:    Number of units banked PRBCs =   none     Post Op complications: none    Hemoglobin at discharge:    Lab Results   Component Value Date/Time    HGB 14.6 08/10/2021 09:19 AM       Dressing was changed on POD # 1. Incision - clean, dry and intact. No significant erythema or swelling. Wound appears to be healing without any evidence of infection. Pt had a HVAC drain that was removed on POD# 1. Neurovascular exam found to be within normal limits. Physical Therapy started following surgery and participated in bed mobility, transfers and ambulation. Gait:  Gait  Base of Support: Narrowed  Ambulation - Level of Assistance: Independent  Distance (ft): 250 Feet (ft)  Assistive Device: Gait belt  Rail Use: Right   Stairs - Level of Assistance: Modified independent  Number of Stairs Trained: 8                   Discharged to: Home. Condition on Discharge:   good    Discharge instructions:  - Take pain medications as prescribed  - Resume pre hospital diet      - Discharge activity: activity as tolerated  - Ambulate as tolerated  - Avoid bending, lifting and twisting  - Cervical Collar  - Wound Care Keep wound clean and dry. See discharge instruction sheet.             -DISCHARGE MEDICATION LIST     Current Discharge Medication List      START taking these medications    Details   acetaminophen-codeine (Tylenol-Codeine #3) 300-30 mg per tablet Take 1 Tablet by mouth every four (4) hours as needed for Pain for up to 14 days. Max Daily Amount: 6 Tablets. Qty: 20 Tablet, Refills: 0  Start date: 8/20/2021, End date: 9/3/2021    Associated Diagnoses: S/P cervical spinal fusion         CONTINUE these medications which have NOT CHANGED    Details   lisinopriL (PRINIVIL, ZESTRIL) 5 mg tablet Take 5 mg by mouth daily. metoprolol succinate (TOPROL-XL) 50 mg XL tablet Take 50 mg by mouth daily. atorvastatin (LIPITOR) 20 mg tablet Take 20 mg by mouth nightly. aspirin delayed-release 81 mg tablet Take 81 mg by mouth daily. cholecalciferol (Vitamin D3) (5000 Units/125 mcg) tab tablet Take  by mouth daily.           per medical continuation form      -Follow up in office in 2 weeks      Signed:  WHITNEY Irizarry     8/20/2021  10:09 AM

## 2021-08-20 NOTE — PROGRESS NOTES
PHYSICAL THERAPY EVALUATION/DISCHARGE  Patient: Diana Smalls (62 y.o. male)  Date: 8/20/2021  Primary Diagnosis: Cervical stenosis of spinal canal [M48.02]  Procedure(s) (LRB):  C3-6 ANTERIOR CERVICAL DISCECTOMY AND FUSION WITH DEPUY PEEK SPACER, SKYLINE PLATE (N/A) 1 Day Post-Op   Precautions:   Back, Fall      ASSESSMENT  Based on the objective data described below, the patient presents with decreased functional mobility from baseline level of function. Patient currently moving well and has no difficulty with bed mobility, transfers and ambulation. Educated on spinal precautions as well as activity modification. He has met all goals and is safe to DC home from a mobility standpoint. No further PT needed. Patient is cleared for discharge from PT standpoint:  YES [x]     NO []       Other factors to consider for discharge: none     Further skilled acute physical therapy is not indicated at this time. PLAN :  Recommendation for discharge: (in order for the patient to meet his/her long term goals)  No skilled physical therapy/ follow up rehabilitation needs identified at this time. IF patient discharges home will need the following DME: none       SUBJECTIVE:   Patient stated I'm surprised because I don't feel any pain.     OBJECTIVE DATA SUMMARY:   HISTORY:    Past Medical History:   Diagnosis Date    Adrenal mass ([address]     STABLE AND BENIGN    Arthritis     CAD (coronary artery disease) 11/2011    MI X 1 STENT    Chronic pain     Hypertension     Sleep apnea     ORAL APPLIANCE     Past Surgical History:   Procedure Laterality Date    HX BACK SURGERY  2004, 2019    LAMINECTOMY AND FUSION    HX ORTHOPAEDIC Left     REPAIR BICEP TENDON    HX VASECTOMY         Prior level of function: Independent with mobility  Personal factors and/or comorbidities impacting plan of care:     Home Situation  Home Environment: Private residence  One/Two Story Residence: Two story  Living Alone: No  Support Systems: Spouse/Significant Other/Partner  Patient Expects to be Discharged to[de-identified] House  Current DME Used/Available at Home: None    EXAMINATION/PRESENTATION/DECISION MAKING:   Critical Behavior:  Neurologic State: Alert  Orientation Level: Oriented X4          Range Of Motion:  AROM: Generally decreased, functional                       Strength:    Strength: Generally decreased, functional         Functional Mobility:  Bed Mobility:  Rolling: Modified independent  Supine to Sit: Independent  Sit to Supine: Independent  Scooting: Independent  Transfers:  Sit to Stand: Independent  Stand to Sit: Independent                       Balance:   Sitting: Intact  Standing: Intact  Ambulation/Gait Training:  Distance (ft): 250 Feet (ft)  Assistive Device: Gait belt  Ambulation - Level of Assistance: Independent     Gait Description (WDL): Exceptions to WDL           Base of Support: Narrowed           Stairs:  Number of Stairs Trained: 8  Stairs - Level of Assistance: Modified independent   Rail Use: Right     Therapeutic Exercises:     Pain Rating:  No c/o pain    Activity Tolerance:   Good      After treatment patient left in no apparent distress:   Sitting in chair and Call bell within reach    COMMUNICATION/EDUCATION:   The patients plan of care was discussed with: Physical therapist, Occupational therapist and Registered nurse. Fall prevention education was provided and the patient/caregiver indicated understanding., Patient/family have participated as able in goal setting and plan of care. and Patient/family agree to work toward stated goals and plan of care.     Thank you for this referral.  Lakesha Coats, PT, DPT   Time Calculation: 16 mins

## 2021-08-20 NOTE — PERIOP NOTES
TRANSFER - OUT REPORT:    Verbal report given to Ruth Patterson RN(name) on Jelly Vargas  being transferred to 546(unit) for routine post - op       Report consisted of patients Situation, Background, Assessment and   Recommendations(SBAR). Time Pre op antibiotic given:112;02 Ancef  Anesthesia Stop time: 26;02  Corbett Present on Transfer to floor:no  Order for Corbett on Chart:no  Discharge Prescriptions with Chart:no    Information from the following report(s) SBAR, Kardex, OR Summary, Procedure Summary, Intake/Output, MAR, Recent Results, Med Rec Status and Cardiac Rhythm SR was reviewed with the receiving nurse. Opportunity for questions and clarification was provided. Is the patient on 02? NO       L/Min        Other     Is the patient on a monitor? NO    Is the nurse transporting with the patient? NO    Surgical Waiting Area notified of patient's transfer from PACU? YES      The following personal items collected during your admission accompanied patient upon transfer:   Dental Appliance: Dental Appliances: None  Vision:    Hearing Aid:    Jewelry: Jewelry: None  Clothing: Clothing:  (bag of clothes returned to pt.)  Other Valuables: Other Valuables:  (brace to O. R)  Valuables sent to safe:

## 2021-08-20 NOTE — DISCHARGE INSTRUCTIONS
After Hospital Care Plan:  Discharge Instructions Cervical (Neck) Spine Surgery Dr. Brielle Maguire    Patient Name: Claire Ko    Date of procedure: 8/19/2021  Date of discharge: 8/20/2021    Procedure: Procedure(s):  C3-6 ANTERIOR CERVICAL DISCECTOMY AND FUSION WITH DEPUY PEEK SPACER, SKYLINE PLATE  PCP: Javier Cao MD      Follow up appointments  -follow up with Dr. Brielle Maguire in 2 weeks. [phone] to make an appointment as soon as you get home from the hospital.    When to call your Spine Surgeon:  -Difficulty swallowing that is worse than when you left the hospital.  -Signs of infection-if your incision is red; continues to have drainage; drainage has a foul odor or if you have a persistent fever over 101 degrees for 24 hours  -nausea or vomiting, severe headache  -changes in sensation in your arms or legs (numbness, tingling, loss of color)  -increased weakness-greater than before your surgery  -severe pain or pain not relieved by medications  -Signs of a blood clot in your leg-calf pain, tenderness, redness, swelling of lower leg    When to call your Primary Care Physician:  -Concerns about medical conditions such as diabetes, high blood pressure, asthma, congestive heart failure  -Call if blood sugars are elevated, persistent headache or dizziness, coughing or congestion, constipation or diarrhea, burning with urination, abnormal heart rate    When to call 911 and go to the nearest emergency room:  -acute onset of chest pain, shortness of breath, difficulty breathing    Activity  - You are going home a well person, be as active as possible. Your only exercise should be walking. Start with short frequent walks and increase your walking distance each day.  -Limit the amount of time you sit to 20-30 minute intervals. Sitting for prolonged periods of time will be uncomfortable for you following surgery.   - Do NOT lift anything over 5 pounds  -From now on, even when lifting light weight, bend with your knees and not your back.  -Do NOT do any neck exercises until you have been instructed by your doctor  -When you are in bed, you may lay on your back or on either side. Do NOT lie on your stomach    Cervical Collar ([place]en Collar)  -You are required to wear your cervical collar at all times; except when showering. You may remove the collar long enough to change the pads when needed and to change your dressing each day. -Do not bend or twist when your collar is off. It is best to have someone assist you when changing the pads and your dressing to prevent you from bending your neck. - Clean the pads on your neck collar every day by hand washing with a mild soap and water. Pat them dry with a towel and lay out to air dry. Do not use heat to dry the pads. Diet  -resume usual diet; drink plenty of fluids; eat foods high in fiber  -It is important to have regular bowel movements. Pain medications may cause constipation. You may want to take a stool softener (such as Senokot-S or Colace) to prevent constipation.  -If constipation occurs, take a laxative (such as Dulcolax tablets, Milk of Magnesia, or a suppository). Laxatives should only be used if the above preventable measures have failed and you still have not had a bowel movement after three days    Driving  -You may not drive or return to work until instructed by your physician. However, you may ride in the car for short periods of time. Incision Care  -You may take brief showers but do not run the water run directly onto the wound.  After showering or bathing gently blot the wound dry with a soft towel.  -Do not rub or apply any lotions or ointments to your incision site.   -Do not soak or scrub your wound; do not swim until the adhesive film has fallen off naturally  -Do not scratch, rub, or pick at the wound or skin glue, doing so may loosen the film before the wound is fully healed  -Stay out of direct sunlight and do not use tanning lamps Showering  -You may shower in approximately 4 days after your surgery.    -Reminder- Make sure you put clean pads on your collar after your shower.    -Do not take a tub bath. Preventing blood clots  -You have been given T.E.D. stockings to wear. Continue to wear these for 7 days after your discharge. Put them on in the morning and take them off at night.    -They are used to increase your circulation and prevent blood clots from forming in your legs  -T. E.D. stockings can be machine washed, temperature not to exceed 160° F (71°C) and machine dried for 15 to 20 minutes, temperature not to exceed 250° F (121°C). Pain management  -Take pain medication as prescribed; decrease the amount you use as your pain lessens.  -Do not wait until you are in extreme pain to take your medication.  -Avoid alcoholic beverages while taking pain medication. Pain Medication Safety  DO:  -Read the Medication Guide   -Take your medicine exactly as prescribed   -Store your medicine away from children and in a safe place   -Flush unused medicine down the toilet   -Call your healthcare provider for medical advice about side effects. You may report side effects to FDA at 4-163-FDA-2160.   -Please be aware that many medications contain Tylenol. We do not want you to over medicate so please read the information below as a guide. Do not take more than 4 Grams of Tylenol in a 24 hour period.   (There are 1000 milligrams in one Gram)                                                                                                                                                                                                                                                                                                                                                                  Percocet contains 325 mg of Tylenol per tablet (do not take more than 12 tablets in 24 hours)  Lortab contains 500 mg of Tylenol per tablet (do not take more than 8 tablets in 24 hours)  Norco contains 325 mg of Tylenol per tablet (do not take more than 12 tablets in 24 hours). DO NOT:  -Do not give your medicine to others   -Do not take medicine unless it was prescribed for you   -Do not stop taking your medicine without talking to your healthcare provider   -Do not break, chew, crush, dissolve, or inject your medicine. If you cannot  swallow your medicine whole, talk to your healthcare provider.  -Do not drink alcohol while taking this medicine  -Do not take anti-inflammatory medications or aspirin unless instructed by your   physician.

## 2021-08-20 NOTE — PROGRESS NOTES
Spine Surgery Progress Note  Micha Casas PA-C  Admit Date: 2021   LOS: 0 days      Daily Progress Note: 2021    POD:1 Day Post-Op    S/P: Procedure(s):  C3-6 ANTERIOR CERVICAL DISCECTOMY AND FUSION WITH DEPUY PEEK SPACER, SKYLINE PLATE    Subjective:     Patient is doing very well on POD#1. Patient has very little pain. He is swallowing, ambulating, and voiding without issue. Switched to soft collar. Denies chest pain, leg pain, nausea, vomiting, difficulty swallowing, headache, and dyspnea. Pt resting comfortably in bed. Objective:     Vital signs  VSS Afebrile. Temp (24hrs), Av.2 °F (36.8 °C), Min:97.5 °F (36.4 °C), Max:98.8 °F (37.1 °C)   No intake/output data recorded.  1901 -  0700  In: 1428.3 [P.O.:100; I.V.:1328.3]  Out: 25 [Drains:10]    Visit Vitals  /73   Pulse 73   Temp 98.4 °F (36.9 °C)   Resp 16   Ht 5' 10\" (1.778 m)   Wt 87.5 kg (192 lb 14.4 oz)   SpO2 97%   BMI 27.68 kg/m²    O2 Flow Rate (L/min): 2 l/min O2 Device: None (Room air)     Output (mL)  Last Bowel Movement Date: 21 (21 1020)  Unmeasurable Output  Urine Occurrence(s): 1 (large amount in bathroom) (21)     Pain control  Pain Assessment  Pain Scale 1: Numeric (0 - 10)  Pain Intensity 1: 0  Pain Onset 1: post op  Pain Location 1: Neck  Pain Orientation 1: Anterior  Pain Description 1: Sore  Pain Intervention(s) 1: Declines    PT/OT  Gait              Physical Exam:  Gen: No acute distress. Neuro: A&Ox3. Follows commands. Speech clear. Affect normal.  PERRL. EOMI. Face symmetric. STEELE spontaneously. Strength 5/5 in UE and LE BL. Sensation intact. Negative drift. Gait deferred. Calves soft and supple;  No pain with passive stretch  Skin: Incision C/D/I    24 hour results:    Recent Results (from the past 24 hour(s))   GLUCOSE, POC    Collection Time: 21 10:42 AM   Result Value Ref Range    Glucose (POC) 109 65 - 117 mg/dL    Performed by Jesús Maradiaga         Assessment: Active Problems:    Cervical stenosis of spinal canal (8/19/2021)      Plan:     s/p C3-6 ACDF  -PT - in soft collar    -Pain control - scheduled tylenol, prn oxycodone, tramadol   -D/c drain   -Regular diet   -Incisional dressing change    Readiness for discharge:     [x] Vital Signs stable    [x] + Voiding    [x] Wound intact, drainage minimal    [x] Tolerating PO intake     [] Cleared by PT (OT if applicable)    [x] Adequate pain control on oral medication alone     Activity: up ad soraida  DVT ppx: SCDs  Dispo: home today    Plan d/w Dr. Darryl So, PA

## 2021-08-21 NOTE — OP NOTES
[facility]  OPERATIVE REPORT    Name:  Marcelina Martines  MR#:  [id number]  :  1955  ACCOUNT #:  [de-identified]  DATE OF SERVICE:  2021    PREOPERATIVE DIAGNOSIS:  Cervical stenosis with myelopathy, C3 to C6. POSTOPERATIVE DIAGNOSIS:  Cervical stenosis with myelopathy, C3 to C6. PROCEDURES PERFORMED:  C3-4, C4-5, C5-6 anterior cervical diskectomy with instrumented fusion, C3 to C6 using DePuy PEEK structural allograft spacers packed with ViviGen and DePuy anterior [place] plate, use of operating microscope. SURGEON:  Radha Corbett MD    ASSISTANT:  Sanaz Dumont. ANESTHESIA:  General endotracheal anesthesia. COMPLICATIONS:  None. SPECIMENS REMOVED:  None. IMPLANTS:  As noted above, use of operating microscope. ESTIMATED BLOOD LOSS:  50 mL. OPERATIVE INDICATIONS:  This is a 72-year-old gentleman with progressive hand numbness and early myelopathy. MRI showed severe spondylitic disease at C3-4 with cord compression, early signal change, C4-5 did  C5-6 showed significant left lateral cord compression and left-sided foraminal stenosis from osteophyte. After discussing treatment options and risks of surgery, informed consent was obtained. PROCEDURE:  The patient was taken to the operating room, placed under general endotracheal anesthesia. All necessary lines and monitors were placed. Given appropriate dose of IV antibiotics. SCDs were placed. He was placed supine on the operating table. All pressure points were padded. The arms tucked by the side. He was taped down. The anterior cervical region was clipped, prepped and draped in standard sterile fashion. Incision was made from the midline to the left sternocleidomastoid with a skin knife, carried down with Bovie electrocautery through the subcutaneous tissue. Platysma was undermined rostrally and caudally. Superficial vein was ligated. The sternocleidomastoid was dissected out.   The plane medial sternocleidomastoid was identified via sharp and blunt dissection, carried down to the prevertebral space. Trachea and esophagus were retracted medially, carotid sheath laterally. Localizing x-ray was obtained. The anterior vertebral bodies of C3, C4, C5, and C6 were cleaned off of soft tissue and the longus colli were undermined bilaterally. The C3-4 space was very rostral in this patient. There was a little bit of an angle. I then placed self-retaining retractors at C3-4 and distraction pins at this level. The superficial disk was removed. Pituitaries, Kerrisons, and curettes were used to clean out the disk space. I then brought the operating microscope and did most of the diskectomy under the microscope. The patient had severe osteophytic compression, both of inferior C3 and rostral C4 with calcified disk compressing the cord. This was removed. I aggressively removed the osteophytes and opened up the calcified ligament down to the spinal sac. I widely decompressed the spinal sac from foramen to foramen, and the spinal and the dural sac did elevate up into the field indicating its decompression. The foramen were opened bilaterally. I then placed an 8-mm PEEK spacer packed with ViviGen allograft in the interspace and countersunk it. I then adjusted the retractors and repeated the process for C4-5. At this level, I did a complete diskectomy. I did not open up the PLL, as there was not significant stenosis at this level. The goals of treatment at this level was because of the severe disease at the adjacent segments. I placed a spacer in this level and countersunk it in a similar fashion. I then performed a complete diskectomy at C5-6.   This disk space was very collapsed with a large anterior osteophyte, as I got down on the spine, removed the entire disk and found a fairly large osteophyte and hard disk on the left side of the thecal sac and compressing, this was removed exposing the thecal sac and bilateral foraminotomies performed. Endplates were decorticated. Another spacer was placed and countersunk. Distraction pins were removed. Scope was taken out of the field. The retractors were adjusted. I used fluoroscopy and then placed, I believe a 54-mm DePuy [place] anterior plate from C3 to C6 with 16-mm Topher screws in C6 bilaterally and 16-mm variable screws of C3, C4, and C5 bilaterally with good purchase of the screws. Locking mechanisms were engaged. The wound was copiously irrigated. Hemostasis was achieved. Retractors were removed. The Hemovac drain was placed, brought out through a separate stab incision. The wound was then closed using 2-0 Vicryl to close the deep dermal layer and running 4-0 Monocryl in a subcuticular fashion. The wounds were cleaned, dried and dressed with sterile dressing. The patient was then extubated, taken to the recovery room in stable condition.         MD WHITNEY [id number]  D:  08/20/2021 14:39  T:  08/20/2021 22:33  JOB #:  [id number]  CC:  MD Matthew Jacinto DR

## (undated) DEVICE — ANTERIOR CERVICAL-SMH: Brand: MEDLINE INDUSTRIES, INC.

## (undated) DEVICE — COVER LT HNDL PLAS RIG 1 PER PK

## (undated) DEVICE — SUTURE VCRL SZ 2-0 L18IN ABSRB UD L26MM CP-2 1/2 CIR REV J762D

## (undated) DEVICE — NEEDLE SPNL 20GA L3.5IN YEL HUB S STL REG WALL FIT STYL W/

## (undated) DEVICE — INTENDED FOR TISSUE SEPARATION, AND OTHER PROCEDURES THAT REQUIRE A SHARP SURGICAL BLADE TO PUNCTURE OR CUT.: Brand: BARD-PARKER ® CARBON RIB-BACK BLADES

## (undated) DEVICE — KIT EVAC 0.13IN RECT TB DIA10FR 400CC PVC 3 SPR Y CONN DRN

## (undated) DEVICE — BIT DRL L12MM DIA2.2MM BLU FLAT CHK FOR ANT CERV PLT SYS

## (undated) DEVICE — GLOVE ORANGE PI 7 1/2   MSG9075

## (undated) DEVICE — DRAPE SURG W41XL74IN CLR FULL SZ C ARM 3 ADH POLY STRP E

## (undated) DEVICE — 3M™ TEGADERM™ TRANSPARENT FILM DRESSING FRAME STYLE, 1624W, 2-3/8 IN X 2-3/4 IN (6 CM X 7 CM), 100/CT 4CT/CASE: Brand: 3M™ TEGADERM™

## (undated) DEVICE — DERMABOND SKIN ADH 0.7ML -- DERMABOND ADVANCED 12/BX

## (undated) DEVICE — SYR 20ML LL STRL LF --

## (undated) DEVICE — BONE WAX WHITE: Brand: BONE WAX WHITE

## (undated) DEVICE — SOLUTION IRRIG 1000ML 0.9% SOD CHL USP POUR PLAS BTL

## (undated) DEVICE — TELFA ADHESIVE ISLAND DRESSING: Brand: TELFA

## (undated) DEVICE — COVER,TABLE,HEAVY DUTY,60"X90",STRL: Brand: MEDLINE

## (undated) DEVICE — BIPOLAR IRRIGATOR INTEGRATED TUBING AND BIPOLAR CORD SET, DISPOSABLE

## (undated) DEVICE — SOLUTION IV 250ML 0.9% SOD CHL CLR INJ FLX BG CONT PRT CLSR

## (undated) DEVICE — DRAPE,LAPAROTOMY,PED,STERILE: Brand: MEDLINE

## (undated) DEVICE — PIN FIX THRD FOR SKYLINE ANT CERV PLT SYS

## (undated) DEVICE — FLOSEAL HEMOSTATIC MATRIX, 10ML: Brand: FLOSEAL HEMOSTATIC MATRIX

## (undated) DEVICE — SUTURE MCRYL SZ 4-0 L27IN ABSRB UD L19MM PS-2 1/2 CIR PRIM Y426H

## (undated) DEVICE — GLOVE SURG SZ 65 L12IN FNGR THK94MIL STD WHT LTX FREE

## (undated) DEVICE — DRAPE MICSCP W46XL120IN POLY DRAWSTRAP W STEREO OBS TB AND

## (undated) DEVICE — TOOL 14MH30 LEGEND 14CM 3MM: Brand: MIDAS REX ™

## (undated) DEVICE — TUBING, SUCTION, 1/4" X 10', STRAIGHT: Brand: MEDLINE

## (undated) DEVICE — SCREW EXT FIX L14MM FOR DISTRCTN

## (undated) DEVICE — INSULATED BLADE ELECTRODE: Brand: EDGE

## (undated) DEVICE — SURGIFOAM SPNG SZ 12-7

## (undated) DEVICE — GLOVE SURG SZ 65 L12IN FNGR THK79MIL GRN LTX FREE